# Patient Record
Sex: MALE | Race: WHITE | NOT HISPANIC OR LATINO | URBAN - METROPOLITAN AREA
[De-identification: names, ages, dates, MRNs, and addresses within clinical notes are randomized per-mention and may not be internally consistent; named-entity substitution may affect disease eponyms.]

---

## 2018-05-15 ENCOUNTER — CLINICAL SUPPORT (OUTPATIENT)
Dept: INFECTIOUS DISEASES | Facility: CLINIC | Age: 48
End: 2018-05-15
Payer: COMMERCIAL

## 2018-05-15 ENCOUNTER — OFFICE VISIT (OUTPATIENT)
Dept: INFECTIOUS DISEASES | Facility: CLINIC | Age: 48
End: 2018-05-15
Payer: COMMERCIAL

## 2018-05-15 VITALS
BODY MASS INDEX: 26.73 KG/M2 | HEART RATE: 86 BPM | TEMPERATURE: 98 F | HEIGHT: 68 IN | SYSTOLIC BLOOD PRESSURE: 137 MMHG | DIASTOLIC BLOOD PRESSURE: 84 MMHG | WEIGHT: 176.38 LBS

## 2018-05-15 DIAGNOSIS — Z71.84 COUNSELING FOR TRAVEL: ICD-10-CM

## 2018-05-15 DIAGNOSIS — Z71.84 COUNSELING FOR TRAVEL: Primary | ICD-10-CM

## 2018-05-15 PROCEDURE — 99402 PREV MED CNSL INDIV APPRX 30: CPT | Mod: S$GLB,,, | Performed by: INTERNAL MEDICINE

## 2018-05-15 PROCEDURE — 90471 IMMUNIZATION ADMIN: CPT | Mod: S$GLB,,, | Performed by: INTERNAL MEDICINE

## 2018-05-15 PROCEDURE — 90715 TDAP VACCINE 7 YRS/> IM: CPT | Mod: S$GLB,,, | Performed by: INTERNAL MEDICINE

## 2018-05-15 PROCEDURE — 99999 PR PBB SHADOW E&M-NEW PATIENT-LVL III: CPT | Mod: PBBFAC,,, | Performed by: INTERNAL MEDICINE

## 2018-05-15 PROCEDURE — 90686 IIV4 VACC NO PRSV 0.5 ML IM: CPT | Mod: S$GLB,,, | Performed by: INTERNAL MEDICINE

## 2018-05-15 PROCEDURE — 90632 HEPA VACCINE ADULT IM: CPT | Mod: S$GLB,,, | Performed by: INTERNAL MEDICINE

## 2018-05-15 PROCEDURE — 90472 IMMUNIZATION ADMIN EACH ADD: CPT | Mod: S$GLB,,, | Performed by: INTERNAL MEDICINE

## 2018-05-15 RX ORDER — AZITHROMYCIN 500 MG/1
TABLET, FILM COATED ORAL
Qty: 2 TABLET | Refills: 0 | Status: SHIPPED | OUTPATIENT
Start: 2018-05-15 | End: 2021-10-05

## 2018-05-15 RX ORDER — ACETAZOLAMIDE 500 MG/1
CAPSULE, EXTENDED RELEASE ORAL
Qty: 14 CAPSULE | Refills: 1 | Status: SHIPPED | OUTPATIENT
Start: 2018-05-15 | End: 2021-10-05

## 2018-05-15 NOTE — PROGRESS NOTES
Pt received the influenza, Tdap, and Hepatitis A vaccination. Pt tolerated the injections well. Pt left the unit in NAD.

## 2018-05-15 NOTE — PROGRESS NOTES
Subjective:      Patient ID: Abdoulaye Lambert is a 47 y.o. male.    Chief Complaint:Travel Consult      History of Present Illness    This patient is being seen for travel advice for an upcoming trip to [facility] only departing on 6/18  for 9 days.  Reason for travel is pleasure.  Medical history and immunization history were reviewed.  Based on travel history, medical history and immunization history I recommend the following:        -hepatitis A #1  -oral typhoid  -influenza vaccine  -TDAP  -zithromax prn traveler's diarrhea  - diamox for altitude      The Patient was provided with an extensive travel guidance packet which provides travel information specific to the patients itinerary as well as food and water safety.     The patient was counseled on:  -Dietary precautions.  -Personal protective measures to prevent insect-borne diseases (e.g., malaria, dengue).  -Precautions to prevent exposure to rabies and seek treatment for possible exposures.  -Precautions against sun exposure.  -Personal and travel safety.    The patient was encouraged to contact us about any problems that may develop after immunization and possible side effects were reviewed.    The patient was instructed to purchase Imodium over the counter to take in case diarrhea (without blood or fever) develops. An antibiotic was ordered for treatment if severe or bloody diarrhea develops and the patient was instructed on use and possible side effects.     The patient was also instructed to purchase insect repellent containing DEET  and apply according to repellent label instructions.  If indicated by the patients itinerary an anti-malarial agent was prescribed for malaria prophylaxis and possible side effects were reviewed.     The patient was instructed to contact us if problems develop after travel.    30 minutes spent with patient in counseling.      Review of Systems   Constitution: Negative for chills, decreased appetite, fever,  weakness, malaise/fatigue, night sweats, weight gain and weight loss.   HENT: Negative for congestion, ear pain, hearing loss, hoarse voice, sore throat and tinnitus.    Eyes: Negative for blurred vision, redness and visual disturbance.   Cardiovascular: Negative for chest pain, leg swelling and palpitations.   Respiratory: Negative for cough, hemoptysis, shortness of breath and sputum production.    Hematologic/Lymphatic: Negative for adenopathy. Does not bruise/bleed easily.   Skin: Negative for dry skin, itching, rash and suspicious lesions.   Musculoskeletal: Negative for back pain, joint pain, myalgias and neck pain.   Gastrointestinal: Negative for abdominal pain, constipation, diarrhea, heartburn, nausea and vomiting.   Genitourinary: Negative for dysuria, flank pain, frequency, hematuria, hesitancy and urgency.   Neurological: Negative for dizziness, headaches, numbness and paresthesias.   Psychiatric/Behavioral: Negative for depression and memory loss. The patient does not have insomnia and is not nervous/anxious.      Objective:   Physical Exam  Assessment:       1. Counseling for travel          Plan:        see  HPI

## 2021-07-21 ENCOUNTER — TELEPHONE (OUTPATIENT)
Dept: FAMILY MEDICINE | Facility: CLINIC | Age: 51
End: 2021-07-21

## 2023-11-14 ENCOUNTER — HOSPITAL ENCOUNTER (OUTPATIENT)
Dept: RADIOLOGY | Facility: OTHER | Age: 53
Discharge: HOME | End: 2023-11-14
Attending: INTERNAL MEDICINE
Payer: COMMERCIAL

## 2023-11-14 DIAGNOSIS — Z01.818 PREOP TESTING: ICD-10-CM

## 2023-11-14 PROCEDURE — 71046 X-RAY EXAM CHEST 2 VIEWS: CPT | Mod: TC,FY

## 2023-11-14 PROCEDURE — 71046 X-RAY EXAM CHEST 2 VIEWS: CPT | Mod: 26,,, | Performed by: RADIOLOGY

## 2023-11-14 PROCEDURE — 71046 XR CHEST PA AND LATERAL: ICD-10-PCS | Mod: 26,,, | Performed by: RADIOLOGY

## 2024-05-28 LAB — CRC RECOMMENDATION EXT: NORMAL

## 2024-08-17 ENCOUNTER — OFFICE VISIT (OUTPATIENT)
Dept: URGENT CARE | Facility: CLINIC | Age: 54
End: 2024-08-17
Payer: COMMERCIAL

## 2024-08-17 VITALS
HEART RATE: 89 BPM | RESPIRATION RATE: 20 BRPM | TEMPERATURE: 98 F | DIASTOLIC BLOOD PRESSURE: 78 MMHG | BODY MASS INDEX: 30.31 KG/M2 | OXYGEN SATURATION: 96 % | HEIGHT: 68 IN | WEIGHT: 200 LBS | SYSTOLIC BLOOD PRESSURE: 124 MMHG

## 2024-08-17 DIAGNOSIS — R07.81 RIB PAIN: ICD-10-CM

## 2024-08-17 DIAGNOSIS — S20.211A CONTUSION OF RIB ON RIGHT SIDE, INITIAL ENCOUNTER: Primary | ICD-10-CM
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
## 2024-08-17 PROCEDURE — 99203 OFFICE O/P NEW LOW 30 MIN: CPT | Mod: S$GLB,,,

## 2024-08-17 PROCEDURE — 71101 X-RAY EXAM UNILAT RIBS/CHEST: CPT | Mod: RT,S$GLB,, | Performed by: RADIOLOGY

## 2024-08-17 NOTE — PROGRESS NOTES
"Subjective:      Patient ID: Abdoulaye Lambert is a 54 y.o. male.    Vitals:  height is 5' 8" (1.727 m) and weight is 90.7 kg (200 lb). His oral temperature is 98.2 °F (36.8 °C). His blood pressure is 124/78 and his pulse is 89. His respiration is 20 and oxygen saturation is 96%.     Chief Complaint: Rib Injury    53 yo male presents with injury to the right side of his ribs. Pt states a week ago he fell on a bed frame. Pt states he has some bruising.     Provider note    53 yo M c/o right rib pain after falling at home a week ago and hitting a bed frame with his chest. Denies sob or any difficulty breathing. He has had fractured ribs in the past     Injury  This is a new problem. The current episode started in the past 7 days. The problem occurs constantly. The problem has been unchanged. The symptoms are aggravated by bending, walking and standing. He has tried ice (Advil) for the symptoms. The treatment provided mild relief.       Cardiovascular:  Negative for sob on exertion.   Respiratory:  Negative for chest tightness and shortness of breath.    Musculoskeletal:  Positive for pain and trauma.      Objective:     Physical Exam   Constitutional: normal  Eyes: Conjunctivae are normal. Pupils are equal, round, and reactive to light. Extraocular movement intact   Pulmonary/Chest: Effort normal. No accessory muscle usage. He exhibits tenderness.   Abdominal: Normal appearance.   Musculoskeletal: Normal range of motion.         General: Normal range of motion.   Neurological: He is alert.   Skin: Skin is warm and dry. bruising         Comments: Bruising noted to right ribs     XR RIB RIGHT W/ PA CHEST    Result Date: 8/17/2024  EXAMINATION: XR RIBS MIN 3 VIEWS W/ PA CHEST RIGHT CLINICAL HISTORY: Pleurodynia TECHNIQUE: Single frontal chest x-ray and two x-ray views of the right-sided ribs. COMPARISON: Chest x-ray dated 11/14/2023. FINDINGS: Chest x-ray: The trachea is unremarkable.  The cardiomediastinal silhouette " is within normal limits.  The hilar structures are unremarkable.  There is no evidence of free air beneath the hemidiaphragms.  There are no pleural effusions.  There is no evidence of a pneumothorax.  There is no evidence of pneumomediastinum.  No airspace opacity is present.  The osseous structures are unremarkable. Right-sided ribs: The bone mineralization is within normal limits.  There is no cortical step-off.  There is no evidence of a displaced right-sided rib fracture.     1.  No acute intrathoracic process. 2.  No displaced right-sided rib fracture.  Short-term follow-up, as clinically warranted. Electronically signed by: Tylor Mckee MD Date:    08/17/2024 Time:    15:08     Assessment:     1. Contusion of rib on right side, initial encounter    2. Rib pain    3. Fall, initial encounter        Plan:   Rest, ice, NSaids/tylenol.     Contusion of rib on right side, initial encounter    Rib pain  -     Cancel: X-Ray Ribs 2 View Right; Future; Expected date: 08/17/2024  -     Cancel: XR CHEST PA AND LATERAL; Future; Expected date: 08/17/2024  -     XR RIB RIGHT W/ PA CHEST; Future; Expected date: 08/17/2024    Fall, initial encounter    We appreciate you trusting us with your medical care. We hope you feel better soon. We will be happy to take care of you for all of your future medical needs.  You must understand that you've received an Urgent Care treatment only and that you may be released before all your medical problems are known or treated. You, the patient, will arrange for follow up care as instructed.  Follow up with your PCP or specialty clinic as directed in the next 1-2 weeks if not improved or as needed.  You can call [phone] to schedule an appointment with the appropriate provider.  Another option is to follow up with [email or web address] ([email or web address]) virtually for quick simple medical advice.  If your condition worsens we recommend that you  receive another evaluation at the emergency room immediately or contact your primary medical clinics after hours call service to discuss your concerns.  Please return here or go to the Emergency Department for any concerns or worsening of condition.        *If you were prescribed a narcotic or controlled medication, do not drive or operate heavy equipment or machinery while taking these medications.

## 2025-09-03 ENCOUNTER — TELEPHONE (OUTPATIENT)
Dept: PHARMACY | Facility: CLINIC | Age: 55
End: 2025-09-03
Payer: COMMERCIAL